# Patient Record
Sex: FEMALE | Race: WHITE | Employment: OTHER | URBAN - METROPOLITAN AREA
[De-identification: names, ages, dates, MRNs, and addresses within clinical notes are randomized per-mention and may not be internally consistent; named-entity substitution may affect disease eponyms.]

---

## 2018-06-25 ENCOUNTER — PROCEDURE VISIT (OUTPATIENT)
Dept: SURGERY | Age: 68
End: 2018-06-25

## 2018-06-25 VITALS
OXYGEN SATURATION: 96 % | TEMPERATURE: 97.8 F | SYSTOLIC BLOOD PRESSURE: 117 MMHG | WEIGHT: 183 LBS | DIASTOLIC BLOOD PRESSURE: 73 MMHG | HEART RATE: 73 BPM

## 2018-06-25 DIAGNOSIS — C44.311 BASAL CELL CARCINOMA OF NOSE: Primary | ICD-10-CM

## 2018-06-25 PROCEDURE — 17311 MOHS 1 STAGE H/N/HF/G: CPT | Performed by: DERMATOLOGY

## 2018-06-25 PROCEDURE — 14060 TIS TRNFR E/N/E/L 10 SQ CM/<: CPT | Performed by: DERMATOLOGY

## 2018-06-25 RX ORDER — AMLODIPINE BESYLATE 5 MG/1
5 TABLET ORAL
COMMUNITY
Start: 2018-02-27

## 2018-06-25 RX ORDER — VALSARTAN AND HYDROCHLOROTHIAZIDE 160; 25 MG/1; MG/1
TABLET ORAL
COMMUNITY
Start: 2018-02-27

## 2018-06-25 RX ORDER — GLIPIZIDE 10 MG/1
10 TABLET, FILM COATED, EXTENDED RELEASE ORAL
COMMUNITY
Start: 2018-02-27

## 2018-06-25 RX ORDER — PETROLATUM,WHITE/LANOLIN
OINTMENT (GRAM) TOPICAL
COMMUNITY

## 2018-06-25 RX ORDER — CYCLOBENZAPRINE HCL 5 MG
5 TABLET ORAL
COMMUNITY
Start: 2018-02-27

## 2018-06-25 RX ORDER — ATENOLOL 25 MG/1
25 TABLET ORAL
COMMUNITY
Start: 2018-02-27

## 2018-06-25 RX ORDER — ATORVASTATIN CALCIUM 40 MG/1
40 TABLET, FILM COATED ORAL
COMMUNITY
Start: 2018-02-27

## 2018-06-25 RX ORDER — DIPHENHYDRAMINE HCL 25 MG
25 CAPSULE ORAL
COMMUNITY

## 2018-06-26 ENCOUNTER — TELEPHONE (OUTPATIENT)
Dept: SURGERY | Age: 68
End: 2018-06-26

## 2018-07-03 ENCOUNTER — OFFICE VISIT (OUTPATIENT)
Dept: SURGERY | Age: 68
End: 2018-07-03

## 2018-07-03 DIAGNOSIS — Z48.02 VISIT FOR SUTURE REMOVAL: Primary | ICD-10-CM

## 2018-07-03 PROCEDURE — 99024 POSTOP FOLLOW-UP VISIT: CPT | Performed by: DERMATOLOGY

## 2018-07-03 NOTE — PATIENT INSTRUCTIONS
[facility] Office Hours:    Monday-Thursday  7:30 AM-4:30 PM    Friday  9:00 AM-3:00 PM    WOUND CARE AFTER SUTURE REMOVAL    After your stiches have been removed, your scar is still very fragile. In fact, scars continue to change and evolve, what we call remodel, for about a year after your procedure. Follow the following steps below to ensure that your scar heals well. Instructions    1. If Steri-strips were applied, keep them on until they fall off on their own. 2. Protect your scar from the sun. Use a sunscreen or bandage to cover your scar. Rogene Ervin exposure can cause your scar to become discolored and appear red or brown. 3. To help soften your scar more rapidly, it is helpful, but not necessary, for you to   massage the scar gently each night for twenty minutes. 4. Spitting suture. Occasionally, an inside suture (stitch) does not completely dissolve. When this happens, (generally 4-8 weeks after surgery), it causes a bump or pimple to form on the scar. This is easily removed and is not at all serious. It   does not mean the skin cancer has returned. Contact us if it happens, but do not be alarmed. 5. If you scar becomes tender, itchy or becomes very large, let Dr. Gabi Price know. There    are treatments that can improve the appearance of your scar or help make it more comfortable.

## 2018-07-03 NOTE — PROGRESS NOTES
S:  The patient is here for suture removal s/p Mohs surgery on the columella and Bilateral V to Y Advancement Flap repair, 1 week(s) ago. The site appears well-healed without signs of infection (redness, pain or discharge). Appears most of flap is surviving. The sutures were removed. Daily Wound care and activity instructions given. The patient was scheduled for follow-up in 2 weeks for scar/wound check. The patient was scheduled for f/u with General Dermatology per their instructions.

## 2018-07-17 ENCOUNTER — OFFICE VISIT (OUTPATIENT)
Dept: SURGERY | Age: 68
End: 2018-07-17

## 2018-07-17 DIAGNOSIS — Z51.89 VISIT FOR WOUND CHECK: Primary | ICD-10-CM

## 2018-07-17 PROCEDURE — 99024 POSTOP FOLLOW-UP VISIT: CPT | Performed by: DERMATOLOGY

## 2018-09-18 ENCOUNTER — OFFICE VISIT (OUTPATIENT)
Dept: SURGERY | Age: 68
End: 2018-09-18

## 2018-09-18 DIAGNOSIS — L90.5 SCAR: Primary | ICD-10-CM

## 2018-09-18 PROCEDURE — 99024 POSTOP FOLLOW-UP VISIT: CPT | Performed by: DERMATOLOGY

## 2018-09-18 RX ORDER — LISINOPRIL 20 MG/1
20 TABLET ORAL DAILY
COMMUNITY

## 2023-08-29 ENCOUNTER — PROCEDURE VISIT (OUTPATIENT)
Dept: SURGERY | Age: 73
End: 2023-08-29
Payer: COMMERCIAL

## 2023-08-29 VITALS — DIASTOLIC BLOOD PRESSURE: 65 MMHG | SYSTOLIC BLOOD PRESSURE: 110 MMHG | HEART RATE: 69 BPM

## 2023-08-29 DIAGNOSIS — C44.629 SQUAMOUS CELL CARCINOMA OF DORSUM OF LEFT HAND: Primary | ICD-10-CM

## 2023-08-29 PROCEDURE — 17311 MOHS 1 STAGE H/N/HF/G: CPT | Performed by: DERMATOLOGY

## 2023-08-29 PROCEDURE — 12042 INTMD RPR N-HF/GENIT2.6-7.5: CPT | Performed by: DERMATOLOGY

## 2023-08-29 RX ORDER — PERPHENAZINE 4 MG
TABLET ORAL
COMMUNITY

## 2023-08-29 RX ORDER — LOSARTAN POTASSIUM AND HYDROCHLOROTHIAZIDE 25; 100 MG/1; MG/1
1 TABLET ORAL DAILY
COMMUNITY
Start: 2022-11-28

## 2023-08-29 RX ORDER — LEVOCETIRIZINE DIHYDROCHLORIDE 5 MG/1
5 TABLET, FILM COATED ORAL DAILY
COMMUNITY

## 2023-08-29 RX ORDER — SITAGLIPTIN 50 MG/1
TABLET, FILM COATED ORAL
COMMUNITY
Start: 2023-06-30

## 2023-08-29 NOTE — PROGRESS NOTES
PRE-PROCEDURE SCREENING    Pacemaker/ICD: No  Difficulty with numbing in the past: No  Local Anesthesia Reaction/passing out: No  Latex or adhesive allergy:  Yes, redness and swelling w/ adhesives, paper tape ok  Bleeding/Clotting Disorders: No  Anticoagulant Therapy: No  Joint prosthesis: No  Artificial Heart Valve: No  Stroke or Seizures: No  Organ Transplant or Lymphoma: No  Immunosuppression: No  Respiratory Problems: No
solution, dried off, Aquaphor ointment was applied, and the wound was covered. A dressing was applied for stabilization and light pressure. The patient was given detailed oral and written instructions on postoperative care. There were no complications. The patient left the Unit in good medical condition. FOLLOW-UP:  As liquid skin adhesive was placed for epidermal closure, the patient was asked to return if any questions or concerns arose, but otherwise will return to see general dermatology per their instructions.

## 2023-08-29 NOTE — PATIENT INSTRUCTIONS
bleeding continues you may remove the bandage to locate where the bleeding is coming from and apply pressure for another 20 minutes with gauze and an ice pack. If the bleeding does not stop after you apply pressure and ice pack, call us right away. If you call after hours a call service will transfer you to the physician. If you cannot call or get through to the doctor, go to the nearest emergency room or urgent care facility. What to expect:  You may have these symptoms. They are normal and should get better with time:  Swelling. Swelling usually increases for the first 48 hours after your procedure and then begins to improve. Some soreness and redness around your wound. If we worked close to your eyes (forehead, nose, temple, or upper cheeks) your eyes may become swollen and/ or black and blue. Bruising, which could last 1 week or more. Pink and bumpy appearance to the scar. This may happen a few weeks after your procedure. After 4 weeks, you may gently massage the area each day with facial moisturizer or petroleum jelly (Vaseline or Aquaphor). This will help to smooth the skin and improve the appearance of the scar. The color of your scar will fade over time but may be pink for several months after the procedure. The scar may take 6 months to 1 year to reach its final color and appearance. \"Spitting\" suture. Occasionally, an inside suture (stitch) does not completely dissolve. When this happens, (generally 4-8 weeks after surgery), it causes a bump or \"pimple\" to form on the scar. This is easily removed and is not at all serious. It does not mean the skin cancer has returned. Contact us if it happens, but do not be alarmed. Vitamin E oil is NOT necessary. A good moisturizer is just as effective. Sunscreen IS necessary. Use at least and SPF 30 sunscreen daily- even in winter    [id number]  -Scars take from 6 months to 1 year to fully mature.  After the area has healed, it may be helpful to gently

## 2023-08-30 ENCOUNTER — TELEPHONE (OUTPATIENT)
Dept: SURGERY | Age: 73
End: 2023-08-30

## 2023-08-30 NOTE — TELEPHONE ENCOUNTER
The patient was in the office on 08/29/2023 for Mohs located on the LT dorsal hand with ILC repair. The patient tolerated the procedure well and left the office in good condition. A post-operative telephone call was placed at 12:47p, 8/30/2023, in order to check on the patient's recovery process. The patient was not able to be reached and a phone message was left. L/M to  call w/any questions/concerns.